# Patient Record
(demographics unavailable — no encounter records)

---

## 2025-01-06 NOTE — PHYSICAL EXAM
[No Acute Distress] : no acute distress [No Edema] : there was no peripheral edema [Normal] : soft, non-tender, non-distended, no masses palpated, no HSM and normal bowel sounds [Normal Posterior Cervical Nodes] : no posterior cervical lymphadenopathy [Normal Anterior Cervical Nodes] : no anterior cervical lymphadenopathy [No Focal Deficits] : no focal deficits [Alert and Oriented x3] : oriented to person, place, and time [de-identified] : non-tender ventral and umbilical herniae again noted

## 2025-01-06 NOTE — HISTORY OF PRESENT ILLNESS
[de-identified] : Presents for BP check, labs, and general follow-up; also due for current flu vaccine - pt in agreement.

## 2025-03-12 NOTE — ASSESSMENT
[FreeTextEntry1] : Approximately 30 minutes was involved in this patient's care, including but not limited to preparing to see the patient, reviewing and obtaining the past and interval history, including medications, reviewing relevant testing, documenting clinical information, ordering of appropriate medications and testing, and coordinating medical care, including communicating with professionals involved in the care of the patient. recent lab work reviewed BP recheck elevated at 150/70 Titrate dose of Amlodipine to 10mg daily, c/w Metoprolol tartrate 50mg BID low sodium diet, reduce caffeinated beverage, loose weight Keep home bp log, bring to next visit NSAIDs/Tylenol PRN for headache ECG done in office: NSR 63 bpm 1st degree AV block, compared with prior ECG 2019 slight change in ECG Cardiology referral for cardiac status check.

## 2025-03-12 NOTE — HISTORY OF PRESENT ILLNESS
[FreeTextEntry8] : Mr Chi Luis is a 67 yo male presents today with concerns for headaches that has been bothersome for the last few weeks. Pt denies any dizziness, n/v/c/d. Pt did report recently also recovered from a viral cold. Pt reports headaches very reminiscent to previously when he was diagnosed with HTN. Advised pt today, bp is elevated again, recommended low sodium diet, exercise, weight loss, reduce caffeinated beverages. Advised today will titrate dose of Amlodipine to 10mg daily, keep home bp log and bring to next visit. Advised today appears likely uncontrolled BP causing his headaches. Pt does report he intends to return for CPE visit with his PCP.